# Patient Record
Sex: MALE | NOT HISPANIC OR LATINO | URBAN - METROPOLITAN AREA
[De-identification: names, ages, dates, MRNs, and addresses within clinical notes are randomized per-mention and may not be internally consistent; named-entity substitution may affect disease eponyms.]

---

## 2023-05-31 ENCOUNTER — OFFICE VISIT (OUTPATIENT)
Dept: PRIMARY CARE | Facility: CLINIC | Age: 28
End: 2023-05-31
Payer: COMMERCIAL

## 2023-05-31 VITALS
WEIGHT: 230 LBS | HEART RATE: 88 BPM | SYSTOLIC BLOOD PRESSURE: 116 MMHG | HEIGHT: 68 IN | TEMPERATURE: 98.3 F | BODY MASS INDEX: 34.86 KG/M2 | DIASTOLIC BLOOD PRESSURE: 77 MMHG | RESPIRATION RATE: 18 BRPM

## 2023-05-31 DIAGNOSIS — F84.0 AUTISTIC DISORDER (HHS-HCC): ICD-10-CM

## 2023-05-31 DIAGNOSIS — G40.909 NONINTRACTABLE EPILEPSY WITHOUT STATUS EPILEPTICUS, UNSPECIFIED EPILEPSY TYPE (MULTI): Primary | ICD-10-CM

## 2023-05-31 PROBLEM — G47.00 INSOMNIA: Status: ACTIVE | Noted: 2023-05-31

## 2023-05-31 PROCEDURE — 1036F TOBACCO NON-USER: CPT | Performed by: FAMILY MEDICINE

## 2023-05-31 PROCEDURE — 99214 OFFICE O/P EST MOD 30 MIN: CPT | Performed by: FAMILY MEDICINE

## 2023-05-31 RX ORDER — HALOPERIDOL 2 MG/ML
3 SOLUTION ORAL 2 TIMES DAILY
COMMUNITY
End: 2023-12-11

## 2023-05-31 RX ORDER — ZOLPIDEM TARTRATE 10 MG/1
10 TABLET ORAL NIGHTLY PRN
COMMUNITY
End: 2023-06-02 | Stop reason: SDUPTHER

## 2023-05-31 RX ORDER — VALPROIC ACID 250 MG/5ML
300 SOLUTION ORAL 3 TIMES DAILY
Qty: 540 ML | Refills: 11 | Status: SHIPPED | OUTPATIENT
Start: 2023-05-31 | End: 2024-05-30

## 2023-05-31 RX ORDER — CLONAZEPAM 1 MG/1
1 TABLET ORAL 3 TIMES DAILY
COMMUNITY
End: 2023-06-02 | Stop reason: SDUPTHER

## 2023-05-31 RX ORDER — VALPROIC ACID 250 MG/5ML
5 SOLUTION ORAL 3 TIMES DAILY
COMMUNITY
Start: 2023-04-13 | End: 2023-05-31 | Stop reason: SDUPTHER
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
## 2023-06-02 ENCOUNTER — TELEPHONE (OUTPATIENT)
Dept: PRIMARY CARE | Facility: CLINIC | Age: 28
End: 2023-06-02
Payer: COMMERCIAL

## 2023-06-02 DIAGNOSIS — G40.909 NONINTRACTABLE EPILEPSY WITHOUT STATUS EPILEPTICUS, UNSPECIFIED EPILEPSY TYPE (MULTI): ICD-10-CM

## 2023-06-02 DIAGNOSIS — G47.00 INSOMNIA, UNSPECIFIED TYPE: ICD-10-CM

## 2023-06-02 RX ORDER — ZOLPIDEM TARTRATE 10 MG/1
10 TABLET ORAL NIGHTLY PRN
Qty: 5 TABLET | Refills: 0 | Status: SHIPPED | OUTPATIENT
Start: 2023-06-02 | End: 2023-06-05 | Stop reason: SDUPTHER

## 2023-06-02 RX ORDER — CLONAZEPAM 1 MG/1
1 TABLET ORAL 3 TIMES DAILY
Qty: 15 TABLET | Refills: 0 | Status: SHIPPED | OUTPATIENT
Start: 2023-06-02 | End: 2023-06-05 | Stop reason: SDUPTHER

## 2023-06-02 NOTE — TELEPHONE ENCOUNTER
Pt was in recently and didn't realize he needs refill on clonazepam and zolpidem  Pt's mother called and said he does not have enough of these medications to get him through the weekend without having seizures  [facility] these [facility] [place] [facility]?  Please advise, thanks!

## 2023-06-02 NOTE — TELEPHONE ENCOUNTER
An OARRS report was printed and I have personally reviewed the results.  The report will be scanned into the health record.  I have considered the risk of abuse, dependance, addiction, and diversion.  I believe it is clinically appropriate for this patient to continue taking this medication.

## 2023-06-05 DIAGNOSIS — G47.00 INSOMNIA, UNSPECIFIED TYPE: ICD-10-CM

## 2023-06-05 DIAGNOSIS — G40.909 NONINTRACTABLE EPILEPSY WITHOUT STATUS EPILEPTICUS, UNSPECIFIED EPILEPSY TYPE (MULTI): ICD-10-CM

## 2023-06-05 RX ORDER — CLONAZEPAM 1 MG/1
1 TABLET ORAL 3 TIMES DAILY
Qty: 270 TABLET | Refills: 3 | Status: SHIPPED | OUTPATIENT
Start: 2023-06-05 | End: 2023-12-04

## 2023-06-05 RX ORDER — ZOLPIDEM TARTRATE 10 MG/1
10 TABLET ORAL NIGHTLY PRN
Qty: 90 TABLET | Refills: 3 | Status: SHIPPED | OUTPATIENT
Start: 2023-06-05 | End: 2023-12-04

## 2023-12-03 DIAGNOSIS — G47.00 INSOMNIA, UNSPECIFIED TYPE: ICD-10-CM

## 2023-12-03 DIAGNOSIS — G40.909 NONINTRACTABLE EPILEPSY WITHOUT STATUS EPILEPTICUS, UNSPECIFIED EPILEPSY TYPE (MULTI): ICD-10-CM

## 2023-12-04 RX ORDER — ZOLPIDEM TARTRATE 10 MG/1
10 TABLET ORAL NIGHTLY PRN
Qty: 90 TABLET | Refills: 3 | Status: SHIPPED | OUTPATIENT
Start: 2023-12-04 | End: 2024-06-04

## 2023-12-04 RX ORDER — CLONAZEPAM 1 MG/1
1 TABLET ORAL 3 TIMES DAILY
Qty: 270 TABLET | Refills: 3 | Status: SHIPPED | OUTPATIENT
Start: 2023-12-04 | End: 2024-06-04

## 2023-12-11 DIAGNOSIS — F84.0 AUTISTIC DISORDER (HHS-HCC): ICD-10-CM

## 2023-12-11 RX ORDER — HALOPERIDOL 2 MG/ML
SOLUTION ORAL
Qty: 180 ML | Refills: 11 | Status: SHIPPED | OUTPATIENT
Start: 2023-12-11

## 2024-01-15 ENCOUNTER — OFFICE VISIT (OUTPATIENT)
Dept: PRIMARY CARE | Facility: CLINIC | Age: 29
End: 2024-01-15
Payer: MEDICARE

## 2024-01-15 VITALS
RESPIRATION RATE: 18 BRPM | BODY MASS INDEX: 36.68 KG/M2 | DIASTOLIC BLOOD PRESSURE: 82 MMHG | HEART RATE: 84 BPM | SYSTOLIC BLOOD PRESSURE: 138 MMHG | WEIGHT: 242 LBS | TEMPERATURE: 98.2 F | HEIGHT: 68 IN

## 2024-01-15 DIAGNOSIS — F51.01 PRIMARY INSOMNIA: ICD-10-CM

## 2024-01-15 DIAGNOSIS — Z00.00 ROUTINE GENERAL MEDICAL EXAMINATION AT HEALTH CARE FACILITY: Primary | ICD-10-CM

## 2024-01-15 DIAGNOSIS — G40.909 NONINTRACTABLE EPILEPSY WITHOUT STATUS EPILEPTICUS, UNSPECIFIED EPILEPSY TYPE (MULTI): ICD-10-CM

## 2024-01-15 DIAGNOSIS — F84.0 AUTISTIC DISORDER (HHS-HCC): ICD-10-CM

## 2024-01-15 PROCEDURE — 1036F TOBACCO NON-USER: CPT | Performed by: FAMILY MEDICINE

## 2024-01-15 PROCEDURE — 99213 OFFICE O/P EST LOW 20 MIN: CPT | Performed by: FAMILY MEDICINE

## 2024-01-15 PROCEDURE — G0439 PPPS, SUBSEQ VISIT: HCPCS | Performed by: FAMILY MEDICINE

## 2024-01-15 ASSESSMENT — ACTIVITIES OF DAILY LIVING (ADL)
GROCERY_SHOPPING: TOTAL CARE
DOING_HOUSEWORK: TOTAL CARE
BATHING: NEEDS ASSISTANCE
DRESSING: NEEDS ASSISTANCE
TAKING_MEDICATION: TOTAL CARE
MANAGING_FINANCES: TOTAL CARE

## 2024-01-15 ASSESSMENT — ENCOUNTER SYMPTOMS: INSOMNIA: 1
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
## 2024-01-15 NOTE — ASSESSMENT & PLAN NOTE
This has continued to be well-controlled with nightly dosing of Ambien which we will continue.  OARRS report was reviewed and is consistent today
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
## 2024-01-15 NOTE — ASSESSMENT & PLAN NOTE
His mother and caregiver does not report any recent seizure activity but he is overdue to have a Depakote level checked.  We will order this along with kidney and liver function studies

## 2024-06-03 DIAGNOSIS — G40.909 NONINTRACTABLE EPILEPSY WITHOUT STATUS EPILEPTICUS, UNSPECIFIED EPILEPSY TYPE (MULTI): ICD-10-CM

## 2024-06-03 DIAGNOSIS — G47.00 INSOMNIA, UNSPECIFIED TYPE: ICD-10-CM

## 2024-06-04 RX ORDER — CLONAZEPAM 1 MG/1
1 TABLET ORAL 3 TIMES DAILY
Qty: 270 TABLET | Refills: 3 | Status: SHIPPED | OUTPATIENT
Start: 2024-06-04

## 2024-06-04 RX ORDER — ZOLPIDEM TARTRATE 10 MG/1
10 TABLET ORAL NIGHTLY PRN
Qty: 90 TABLET | Refills: 3 | Status: SHIPPED | OUTPATIENT
Start: 2024-06-04

## 2024-06-27 DIAGNOSIS — G40.909 NONINTRACTABLE EPILEPSY WITHOUT STATUS EPILEPTICUS, UNSPECIFIED EPILEPSY TYPE (MULTI): ICD-10-CM

## 2024-06-27 DIAGNOSIS — F84.0 AUTISTIC DISORDER (HHS-HCC): ICD-10-CM

## 2024-06-27 RX ORDER — VALPROIC ACID 250 MG/5ML
SOLUTION ORAL
Qty: 540 ML | Refills: 11 | Status: SHIPPED | OUTPATIENT
Start: 2024-06-27

## 2024-07-18 ENCOUNTER — APPOINTMENT (OUTPATIENT)
Dept: PRIMARY CARE | Facility: CLINIC | Age: 29
End: 2024-07-18
Payer: MEDICARE

## 2024-07-18 VITALS
BODY MASS INDEX: 36.53 KG/M2 | HEIGHT: 68 IN | DIASTOLIC BLOOD PRESSURE: 77 MMHG | HEART RATE: 80 BPM | RESPIRATION RATE: 20 BRPM | TEMPERATURE: 97.7 F | SYSTOLIC BLOOD PRESSURE: 124 MMHG | WEIGHT: 241 LBS

## 2024-07-18 DIAGNOSIS — B35.1 ONYCHOMYCOSIS: ICD-10-CM

## 2024-07-18 DIAGNOSIS — F84.0 AUTISTIC DISORDER (HHS-HCC): ICD-10-CM

## 2024-07-18 DIAGNOSIS — G40.909 NONINTRACTABLE EPILEPSY WITHOUT STATUS EPILEPTICUS, UNSPECIFIED EPILEPSY TYPE (MULTI): Primary | ICD-10-CM

## 2024-07-18 DIAGNOSIS — Z00.00 ROUTINE GENERAL MEDICAL EXAMINATION AT HEALTH CARE FACILITY: ICD-10-CM

## 2024-07-18 DIAGNOSIS — F51.01 PRIMARY INSOMNIA: ICD-10-CM

## 2024-07-18 PROCEDURE — 99213 OFFICE O/P EST LOW 20 MIN: CPT | Performed by: FAMILY MEDICINE

## 2024-07-18 PROCEDURE — 3008F BODY MASS INDEX DOCD: CPT | Performed by: FAMILY MEDICINE

## 2024-07-18 PROCEDURE — 1036F TOBACCO NON-USER: CPT | Performed by: FAMILY MEDICINE

## 2024-07-18 ASSESSMENT — ENCOUNTER SYMPTOMS: INSOMNIA: 1

## 2024-07-18 NOTE — PROGRESS NOTES
"Subjective   Henry García is a 29 y.o. male who presents for Nail Problem and Insomnia.     Insomnia  This is a chronic problem. The problem occurs daily. Progression since onset: stable. Treatments tried: Ambien. The treatment provided significant relief.     His mom also has concerns regarding his toenails, states that he refuses to let anyone trim them and he picks at them causing them to bleed.     They are very thickened and yellow and pinching in his shoes.    Unfortunately he has had some more \"meltdown\" emotional outbursts making it impossible for him to continue attending the [employer].  He is also been unable to get the blood work.  His mother has tried several times but he again gets extremely emotionally upset when they try to get the blood drawn and is unable to hold still for the procedure.    Visit Vitals  /77   Pulse 80   Temp 36.5 °C (97.7 °F)   Resp 20      Objective   Physical Exam       Assessment/Plan      Assessment & Plan  Routine general medical examination at health care facility    Orders:    Follow Up In [facility] - PCP - Established    Nonintractable epilepsy without status epilepticus, unspecified epilepsy type (Multi)  His mother reports no observed seizure activity since her last visit.  We will continue his current dosing of Depakote.  We do have a pending lab order but he has been unable to get this because his anxiety level for blood draws is too high.       Autistic disorder ([facility])  He continues to have 24-hour support by his parents  Orders:    Follow Up In [facility] - PCP - Health Maintenance; Future    Primary insomnia  We will continue the zolpidem 10 mg at bedtime       Onychomycosis  This is fairly severe on all of his toenails and causing significant discomfort.  I am referring him to podiatry for nail management  Orders:    Referral to Podiatry; Future           Please excuse any errors in grammar or translation related to " this dictation. Voice recognition software was utilized to prepare this document.

## 2024-07-18 NOTE — ASSESSMENT & PLAN NOTE
He continues to have 24-hour support by his parents  Orders:    Follow Up In [facility] - PCP - Health Maintenance; Future

## 2024-07-18 NOTE — ASSESSMENT & PLAN NOTE
His mother reports no observed seizure activity since her last visit.  We will continue his current dosing of Depakote.  We do have a pending lab order but he has been unable to get this because his anxiety level for blood draws is too high.

## 2024-10-01 DIAGNOSIS — G40.909 NONINTRACTABLE EPILEPSY WITHOUT STATUS EPILEPTICUS, UNSPECIFIED EPILEPSY TYPE (MULTI): ICD-10-CM

## 2024-10-02 RX ORDER — CLONAZEPAM 1 MG/1
1 TABLET ORAL 3 TIMES DAILY
Qty: 270 TABLET | Refills: 3 | Status: SHIPPED | OUTPATIENT
Start: 2024-10-02

## 2024-10-24 ENCOUNTER — TELEPHONE (OUTPATIENT)
Dept: PRIMARY CARE | Facility: CLINIC | Age: 29
End: 2024-10-24
Payer: COMMERCIAL

## 2024-10-24 ENCOUNTER — TELEPHONE VISIT (OUTPATIENT)
Dept: PRIMARY CARE | Facility: CLINIC | Age: 29
End: 2024-10-24
Payer: COMMERCIAL

## 2024-10-24 DIAGNOSIS — U07.1 COVID: Primary | ICD-10-CM

## 2024-10-24 PROCEDURE — 99213 OFFICE O/P EST LOW 20 MIN: CPT | Performed by: FAMILY MEDICINE

## 2024-10-24 NOTE — PROGRESS NOTES
I called and spoke with his guardian.  Apparently he tested positive for COVID-19 and the whole family has it right now.  He is running a low-grade fever about 100.6 and has some congestion but otherwise does not seem acutely ill.  We discussed the possibility of Paxlovid to prevent more severe of your infection but he is currently taking clonazepam which has a severe interaction.  It would be unsafe to stop this medicine due to his underlying seizure disorder so I think with his mild symptoms it is better off to treat the symptoms and hopefully he will fight this off fairly quickly.  I did recommend that if he starts to show increased respiratory rate or looks to be in any kind of distress she should bring him to the emergency room for evaluation

## 2024-10-24 NOTE — TELEPHONE ENCOUNTER
Patient's mom called stating that Henry had taken an at home Covid test and it was positive.  She is asking if Dr. Rea can prescribe Paxlovid, and for it to be sent to [facility] in [place].

## 2024-10-25 ENCOUNTER — TELEPHONE (OUTPATIENT)
Dept: PRIMARY CARE | Facility: CLINIC | Age: 29
End: 2024-10-25
Payer: COMMERCIAL

## 2024-10-25 NOTE — TELEPHONE ENCOUNTER
[facility] Nurse for Sister came to the Home and Mom asked her to listen to  Henry noriega. He is experiencing Wising in Lower and Higher Lobes and Oxygen level was 94, averaging Low 90's and high 80's.  Thank you

## 2024-12-30 DIAGNOSIS — F84.0 AUTISTIC DISORDER (HHS-HCC): ICD-10-CM

## 2024-12-30 RX ORDER — HALOPERIDOL 2 MG/ML
SOLUTION ORAL
Qty: 180 ML | Refills: 11 | Status: SHIPPED | OUTPATIENT
Start: 2024-12-30

## 2025-01-02 DIAGNOSIS — G47.00 INSOMNIA, UNSPECIFIED TYPE: ICD-10-CM

## 2025-01-02 RX ORDER — ZOLPIDEM TARTRATE 10 MG/1
10 TABLET ORAL NIGHTLY PRN
Qty: 30 TABLET | Refills: 0 | Status: SHIPPED | OUTPATIENT
Start: 2025-01-02

## 2025-01-15 ENCOUNTER — APPOINTMENT (OUTPATIENT)
Dept: PRIMARY CARE | Facility: CLINIC | Age: 30
End: 2025-01-15
Payer: MEDICARE

## 2025-01-15 VITALS
HEIGHT: 68 IN | SYSTOLIC BLOOD PRESSURE: 122 MMHG | BODY MASS INDEX: 37.89 KG/M2 | DIASTOLIC BLOOD PRESSURE: 74 MMHG | WEIGHT: 250 LBS | RESPIRATION RATE: 18 BRPM | HEART RATE: 90 BPM | TEMPERATURE: 97.9 F

## 2025-01-15 DIAGNOSIS — G40.909 NONINTRACTABLE EPILEPSY WITHOUT STATUS EPILEPTICUS, UNSPECIFIED EPILEPSY TYPE (MULTI): ICD-10-CM

## 2025-01-15 DIAGNOSIS — F84.0 AUTISTIC DISORDER (HHS-HCC): ICD-10-CM

## 2025-01-15 DIAGNOSIS — Z11.59 ENCOUNTER FOR HEPATITIS C SCREENING TEST FOR LOW RISK PATIENT: Primary | ICD-10-CM

## 2025-01-15 DIAGNOSIS — Z00.00 WELL ADULT EXAM: ICD-10-CM

## 2025-01-15 DIAGNOSIS — Z00.00 ROUTINE GENERAL MEDICAL EXAMINATION AT HEALTH CARE FACILITY: ICD-10-CM

## 2025-01-15 DIAGNOSIS — G47.00 INSOMNIA, UNSPECIFIED TYPE: ICD-10-CM

## 2025-01-15 PROCEDURE — 99395 PREV VISIT EST AGE 18-39: CPT | Performed by: FAMILY MEDICINE

## 2025-01-15 PROCEDURE — 3008F BODY MASS INDEX DOCD: CPT | Performed by: FAMILY MEDICINE

## 2025-01-15 PROCEDURE — 1036F TOBACCO NON-USER: CPT | Performed by: FAMILY MEDICINE

## 2025-01-15 RX ORDER — ZOLPIDEM TARTRATE 10 MG/1
10 TABLET ORAL NIGHTLY PRN
Qty: 30 TABLET | Refills: 5 | Status: SHIPPED | OUTPATIENT
Start: 2025-01-15

## 2025-01-15 ASSESSMENT — ENCOUNTER SYMPTOMS
NERVOUS/ANXIOUS: 1
INSOMNIA: 1
RESTLESSNESS: 1

## 2025-01-15 NOTE — ASSESSMENT & PLAN NOTE
He continues to do well with his combination of Depakote and Klonopin.  We will add a Depakote level to screening labs today.  A PDMP was reviewed  Orders:    Comprehensive Metabolic Panel; Future    CBC; Future    Valproic Acid; Future    Follow Up In [facility] - PCP; Future

## 2025-01-15 NOTE — ASSESSMENT & PLAN NOTE
He continues to benefit from Ambien.  A PDMP report was reviewed today.  It sounds to me like he is getting too many hours of sleep which is causing him to awaken in the middle of the night frequently.  I recommend pushing his bedtime back by another hour to see if this helps.  We will continue the Ambien with his other medications.  Orders:    zolpidem (Ambien) 10 mg tablet; Take 1 tablet (10 mg) by mouth as needed at bedtime for sleep.

## 2025-01-15 NOTE — PROGRESS NOTES
Subjective   Henry García is a 29 y.o. male who presents for Insomnia and Anxiety.     Insomnia  This is a chronic problem. The problem occurs daily. The problem has been unchanged. Treatments tried: Ambien. The treatment provided mild relief.   Anxiety  Presents for follow-up visit. Symptoms include insomnia, nervous/anxious behavior and restlessness. Symptoms occur most days. The quality of sleep is fair. Nighttime awakenings: several.       He continues to receive care from his mother who is responsible for prompting him for all ADLs.  He continues to be nonverbal but acknowledges understanding when he is addressed.  His mother reports that he continues to have some insomnia but he does go to bed around 8:00 and then often wakes up in the middle of the night for short period of time.  Visit Vitals  /74   Pulse 90   Temp 36.6 °C (97.9 °F)   Resp 18      Objective   Physical Exam       No data recorded   Assessment & Plan  Routine general medical examination at health care facility  Screening blood work was missed last year so we will reorder these labs and get them done as soon as possible.  Orders:    1 Year Follow Up In [facility] - PCP - Wellness Exam    Insomnia, unspecified type  He continues to benefit from Ambien.  A PDMP report was reviewed today.  It sounds to me like he is getting too many hours of sleep which is causing him to awaken in the middle of the night frequently.  I recommend pushing his bedtime back by another hour to see if this helps.  We will continue the Ambien with his other medications.  Orders:    zolpidem (Ambien) 10 mg tablet; Take 1 tablet (10 mg) by mouth as needed at bedtime for sleep.    Nonintractable epilepsy without status epilepticus, unspecified epilepsy type (Multi)  He continues to do well with his combination of Depakote and Klonopin.  We will add a Depakote level to screening labs today.  A PDMP was reviewed  Orders:    Comprehensive Metabolic Panel;  Future    CBC; Future    Valproic Acid; Future    Follow Up In [facility] - PCP; Future    Autistic disorder ([facility])         Encounter for hepatitis C screening test for low risk patient    Orders:    Hepatitis C Antibody; Future    Well adult exam    Orders:    Follow Up In [facility] - PCP; Future           Please excuse any errors in grammar or translation related to this dictation. Voice recognition software was utilized to prepare this document.

## 2025-01-23 ENCOUNTER — APPOINTMENT (OUTPATIENT)
Dept: PRIMARY CARE | Facility: CLINIC | Age: 30
End: 2025-01-23
Payer: COMMERCIAL

## 2025-01-30 ENCOUNTER — TELEPHONE (OUTPATIENT)
Dept: PRIMARY CARE | Facility: CLINIC | Age: 30
End: 2025-01-30
Payer: COMMERCIAL

## 2025-01-30 DIAGNOSIS — G40.909 NONINTRACTABLE EPILEPSY WITHOUT STATUS EPILEPTICUS, UNSPECIFIED EPILEPSY TYPE (MULTI): Primary | ICD-10-CM

## 2025-01-30 NOTE — TELEPHONE ENCOUNTER
Pt mom would like referral for neuro to Dr. Mendosa.  (Closer to home)  Ref #[phone].  Please advise

## 2025-03-10 ENCOUNTER — APPOINTMENT (OUTPATIENT)
Dept: NEUROLOGY | Facility: CLINIC | Age: 30
End: 2025-03-10
Payer: COMMERCIAL

## 2025-03-10 VITALS
HEIGHT: 68 IN | BODY MASS INDEX: 37.77 KG/M2 | SYSTOLIC BLOOD PRESSURE: 108 MMHG | WEIGHT: 249.2 LBS | DIASTOLIC BLOOD PRESSURE: 76 MMHG | HEART RATE: 101 BPM

## 2025-03-10 DIAGNOSIS — G40.909 NONINTRACTABLE EPILEPSY WITHOUT STATUS EPILEPTICUS, UNSPECIFIED EPILEPSY TYPE (MULTI): Primary | ICD-10-CM

## 2025-03-10 DIAGNOSIS — F84.0 AUTISTIC DISORDER (HHS-HCC): ICD-10-CM

## 2025-03-10 PROCEDURE — 3008F BODY MASS INDEX DOCD: CPT | Performed by: STUDENT IN AN ORGANIZED HEALTH CARE EDUCATION/TRAINING PROGRAM

## 2025-03-10 PROCEDURE — G2212 PROLONG OUTPT/OFFICE VIS: HCPCS | Performed by: STUDENT IN AN ORGANIZED HEALTH CARE EDUCATION/TRAINING PROGRAM

## 2025-03-10 PROCEDURE — 99215 OFFICE O/P EST HI 40 MIN: CPT | Performed by: STUDENT IN AN ORGANIZED HEALTH CARE EDUCATION/TRAINING PROGRAM

## 2025-03-10 ASSESSMENT — PATIENT HEALTH QUESTIONNAIRE - PHQ9
1. LITTLE INTEREST OR PLEASURE IN DOING THINGS: NOT AT ALL
SUM OF ALL RESPONSES TO PHQ9 QUESTIONS 1 & 2: 0
2. FEELING DOWN, DEPRESSED OR HOPELESS: NOT AT ALL
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
## 2025-03-11 ENCOUNTER — APPOINTMENT (OUTPATIENT)
Dept: NEUROLOGY | Facility: CLINIC | Age: 30
End: 2025-03-11
Payer: COMMERCIAL

## 2025-04-29 DIAGNOSIS — G40.909 NONINTRACTABLE EPILEPSY WITHOUT STATUS EPILEPTICUS, UNSPECIFIED EPILEPSY TYPE (MULTI): ICD-10-CM

## 2025-04-30 RX ORDER — CLONAZEPAM 1 MG/1
1 TABLET ORAL 3 TIMES DAILY
Qty: 270 TABLET | Refills: 3 | Status: SHIPPED | OUTPATIENT
Start: 2025-04-30

## 2025-07-16 ENCOUNTER — APPOINTMENT (OUTPATIENT)
Dept: PRIMARY CARE | Facility: CLINIC | Age: 30
End: 2025-07-16
Payer: COMMERCIAL

## 2025-07-16 DIAGNOSIS — G40.909 NONINTRACTABLE EPILEPSY WITHOUT STATUS EPILEPTICUS, UNSPECIFIED EPILEPSY TYPE (MULTI): ICD-10-CM

## 2025-07-16 DIAGNOSIS — F84.0 AUTISTIC DISORDER (HHS-HCC): ICD-10-CM

## 2025-07-17 RX ORDER — VALPROIC ACID 250 MG/5ML
SOLUTION ORAL
Qty: 540 ML | Refills: 11 | Status: SHIPPED | OUTPATIENT
Start: 2025-07-17

## 2025-07-27 DIAGNOSIS — G47.00 INSOMNIA, UNSPECIFIED TYPE: ICD-10-CM

## 2025-07-28 RX ORDER — ZOLPIDEM TARTRATE 10 MG/1
10 TABLET ORAL NIGHTLY PRN
Qty: 30 TABLET | Refills: 5 | Status: SHIPPED | OUTPATIENT
Start: 2025-07-28

## 2025-09-08 ENCOUNTER — APPOINTMENT (OUTPATIENT)
Dept: NEUROLOGY | Facility: CLINIC | Age: 30
End: 2025-09-08
Payer: COMMERCIAL